# Patient Record
Sex: MALE | Race: WHITE | Employment: OTHER | URBAN - METROPOLITAN AREA
[De-identification: names, ages, dates, MRNs, and addresses within clinical notes are randomized per-mention and may not be internally consistent; named-entity substitution may affect disease eponyms.]

---

## 2022-05-13 ENCOUNTER — APPOINTMENT (OUTPATIENT)
Dept: GENERAL RADIOLOGY | Age: 72
End: 2022-05-13
Attending: STUDENT IN AN ORGANIZED HEALTH CARE EDUCATION/TRAINING PROGRAM
Payer: MEDICARE

## 2022-05-13 ENCOUNTER — HOSPITAL ENCOUNTER (EMERGENCY)
Age: 72
Discharge: HOME | End: 2022-05-13
Attending: STUDENT IN AN ORGANIZED HEALTH CARE EDUCATION/TRAINING PROGRAM | Admitting: STUDENT IN AN ORGANIZED HEALTH CARE EDUCATION/TRAINING PROGRAM
Payer: MEDICARE

## 2022-05-13 VITALS
WEIGHT: 210 LBS | RESPIRATION RATE: 20 BRPM | DIASTOLIC BLOOD PRESSURE: 110 MMHG | HEIGHT: 65 IN | SYSTOLIC BLOOD PRESSURE: 200 MMHG | BODY MASS INDEX: 34.99 KG/M2 | OXYGEN SATURATION: 97 % | TEMPERATURE: 100 F | HEART RATE: 80 BPM

## 2022-05-13 DIAGNOSIS — J20.8 VIRAL BRONCHITIS: Primary | ICD-10-CM

## 2022-05-13 DIAGNOSIS — R07.89 CHEST WALL PAIN: ICD-10-CM

## 2022-05-13 DIAGNOSIS — I10 ASYMPTOMATIC HYPERTENSION: ICD-10-CM

## 2022-05-13 LAB
COVID-19 RAPID TEST, COVR: NOT DETECTED
FLUAV AG NPH QL IA: NEGATIVE
FLUBV AG NOSE QL IA: NEGATIVE
SOURCE, COVRS: NORMAL

## 2022-05-13 PROCEDURE — 96372 THER/PROPH/DIAG INJ SC/IM: CPT

## 2022-05-13 PROCEDURE — 99284 EMERGENCY DEPT VISIT MOD MDM: CPT

## 2022-05-13 PROCEDURE — 87804 INFLUENZA ASSAY W/OPTIC: CPT

## 2022-05-13 PROCEDURE — 87635 SARS-COV-2 COVID-19 AMP PRB: CPT

## 2022-05-13 PROCEDURE — 71045 X-RAY EXAM CHEST 1 VIEW: CPT

## 2022-05-13 PROCEDURE — 74011250636 HC RX REV CODE- 250/636: Performed by: STUDENT IN AN ORGANIZED HEALTH CARE EDUCATION/TRAINING PROGRAM

## 2022-05-13 RX ORDER — LIDOCAINE 4 G/100G
1 PATCH TOPICAL EVERY 12 HOURS
Qty: 5 PATCH | Refills: 2 | Status: SHIPPED | OUTPATIENT
Start: 2022-05-13

## 2022-05-13 RX ORDER — KETOROLAC TROMETHAMINE 30 MG/ML
30 INJECTION, SOLUTION INTRAMUSCULAR; INTRAVENOUS
Status: COMPLETED | OUTPATIENT
Start: 2022-05-13 | End: 2022-05-13

## 2022-05-13 RX ORDER — ACETAMINOPHEN 500 MG
1000 TABLET ORAL
Status: DISCONTINUED | OUTPATIENT
Start: 2022-05-13 | End: 2022-05-13

## 2022-05-13 RX ORDER — NAPROXEN 500 MG/1
500 TABLET ORAL
Qty: 20 TABLET | Refills: 0 | Status: SHIPPED | OUTPATIENT
Start: 2022-05-13

## 2022-05-13 RX ORDER — ALBUTEROL SULFATE 90 UG/1
2 AEROSOL, METERED RESPIRATORY (INHALATION)
Qty: 18 G | Refills: 0 | Status: SHIPPED | OUTPATIENT
Start: 2022-05-13

## 2022-05-13 RX ADMIN — KETOROLAC TROMETHAMINE 30 MG: 30 INJECTION, SOLUTION INTRAMUSCULAR at 22:47

## 2022-05-14 NOTE — ED PROVIDER NOTES
EMERGENCY DEPARTMENT HISTORY AND PHYSICAL EXAM      Date: 5/13/2022  Patient Name: Yeimy Nielsen    History of Presenting Illness     Chief Complaint   Patient presents with    Cough     Patient to triage w c/o a chest cold onset 2 weeks ago. Reports he is unvaccinated.  Chest Congestion       History Provided By: Patient    HPI: Yeimy Nielsen, 74 y.o. male with past medical history of no known medical problems presents to the ED with cc of cough, congestion, shortness of breath, and right-sided rib pain. Patient reports he has had 2 weeks of the above symptoms. Reports he has developed right-sided anterior chest wall pain from repeated coughing episodes. States pain is sharp in nature, comes on only when he coughs or twists his torso. Absent when he is not moving or coughing. States he is able to reproduce the pain in a discrete location in the anterior lateral chest wall. Earlier today, patient reports he had a 5-second episode of severe shortness of breath that then passed. He denies any fevers or chills. No known sick contacts. Patient has not been tested for COVID recently. His blood pressure is noted to be extremely elevated in the ED, however, he states that he has not taken any of his medications today. Reports his blood pressure normally is well controlled when he does take his BP meds. PCP: No primary care provider on file. No current facility-administered medications on file prior to encounter. No current outpatient medications on file prior to encounter. Past History     Past Medical History:  No past medical history on file. Past Surgical History:  No past surgical history on file. Family History:  No family history on file. Social History:  Social History     Tobacco Use    Smoking status: Not on file    Smokeless tobacco: Not on file   Substance Use Topics    Alcohol use: Not on file    Drug use: Not on file       Allergies:   Allergies   Allergen Reactions  Lisinopril Hives         Review of Systems   Review of Systems   Constitutional: Negative for chills and fever. HENT: Positive for congestion and rhinorrhea. Eyes: Negative for visual disturbance. Respiratory: Positive for cough and shortness of breath. Negative for chest tightness and wheezing. Cardiovascular: Negative for chest pain, palpitations and leg swelling. Gastrointestinal: Negative for abdominal pain, diarrhea, nausea and vomiting. Genitourinary: Negative for dysuria, flank pain and hematuria. Musculoskeletal: Positive for myalgias. Negative for back pain and neck pain. Skin: Negative for rash. Allergic/Immunologic: Negative for immunocompromised state. Neurological: Negative for dizziness, speech difficulty, weakness and headaches. Hematological: Negative for adenopathy. Psychiatric/Behavioral: Negative for dysphoric mood and suicidal ideas. Physical Exam   Physical Exam  Vitals and nursing note reviewed. Constitutional:       General: He is not in acute distress. Appearance: Normal appearance. He is not ill-appearing or toxic-appearing. HENT:      Head: Normocephalic and atraumatic. Nose: Nose normal.      Mouth/Throat:      Mouth: Mucous membranes are moist.   Eyes:      Extraocular Movements: Extraocular movements intact. Pupils: Pupils are equal, round, and reactive to light. Cardiovascular:      Rate and Rhythm: Normal rate and regular rhythm. Pulses: Normal pulses. Pulmonary:      Effort: Pulmonary effort is normal. No respiratory distress. Breath sounds: No stridor. Examination of the right-lower field reveals wheezing. Examination of the left-lower field reveals wheezing. Wheezing present. No rhonchi. Chest:      Chest wall: Tenderness present. Abdominal:      General: Abdomen is flat. There is no distension. Palpations: There is no mass. Tenderness: There is no abdominal tenderness.    Musculoskeletal: General: Normal range of motion. Cervical back: Normal range of motion and neck supple. Skin:     General: Skin is warm and dry. Neurological:      General: No focal deficit present. Mental Status: He is alert. Mental status is at baseline. Sensory: No sensory deficit. Motor: No weakness. Diagnostic Study Results     Labs -     Recent Results (from the past 24 hour(s))   COVID-19 RAPID TEST    Collection Time: 05/13/22 10:05 PM   Result Value Ref Range    Specimen source Nasopharyngeal      COVID-19 rapid test Not detected NOTD     INFLUENZA A+B VIRAL AGS    Collection Time: 05/13/22 10:05 PM   Result Value Ref Range    Influenza A Antigen Negative NEG      Influenza B Antigen Negative NEG         Radiologic Studies -   XR CHEST PORT   Final Result   Cardiomegaly. No infiltrate        CT Results  (Last 48 hours)    None        CXR Results  (Last 48 hours)               05/13/22 2200  XR CHEST PORT Final result    Impression:  Cardiomegaly. No infiltrate       Narrative:  EXAM: XR CHEST PORT       INDICATION: cough       COMPARISON: None. FINDINGS: A portable AP radiograph of the chest was obtained at 2155 hours. The   patient is on a cardiac monitor. The lungs are clear. The cardiac and   mediastinal contours and pulmonary vascularity are remarkable for cardiomegaly. There are multiple right posterior rib deformities. Moderate ossification is   noted of the acromioclavicular ligament. Medical Decision Making   I, Sherine Lacey MD am the first provider for this patient, and I am the attending of record for this patient encounter. I reviewed the vital signs, available nursing notes, past medical history, past surgical history, family history and social history. Vital Signs-Reviewed the patient's vital signs.   Patient Vitals for the past 24 hrs:   Temp Pulse Resp BP SpO2   05/13/22 2212 -- -- -- (!) 200/110 --   05/13/22 2147 100 °F (37.8 °C) 80 20 (!) 219/109 97 %     Records Reviewed: Nursing Notes and Old Medical Records    Provider Notes (Medical Decision Making):   DDx: Bronchitis, viral URI, musculoskeletal chest wall pain, pneumothorax, pneumonia, COVID infection, influenza, etc.    Plan: Rapid COVID, influenza swab, chest x-ray. IM Toradol for chest wall pain. COVID and influenza studies are negative. Chest x-ray negative for infiltrate. Patient symptoms most likely secondary to viral bronchitis along musculoskeletal chest wall pain. Will provide Rx for albuterol inhaler, lidocaine patch, and naproxen for home treatment of symptoms. Advised to take his home antihypertensives for extremely elevated BP. No indication to treat BP emergently secondary to patient not having any symptoms concerning for endorgan damage at this time. ED Course:   Initial assessment performed. The patient's presenting problems have been discussed, and they are in agreement with the care plan formulated and outlined with them. I have encouraged them to ask questions as they arise throughout their visit. Luz Elena Chandler MD      Disposition:  Discharge      DISCHARGE PLAN:  1. Current Discharge Medication List      START taking these medications    Details   naproxen (NAPROSYN) 500 mg tablet Take 1 Tablet by mouth every twelve (12) hours as needed for Pain. Qty: 20 Tablet, Refills: 0  Start date: 5/13/2022      lidocaine 4 % patch 1 Patch by TransDERmal route every twelve (12) hours every twelve (12) hours. Qty: 5 Patch, Refills: 2  Start date: 5/13/2022      albuterol (PROVENTIL HFA, VENTOLIN HFA, PROAIR HFA) 90 mcg/actuation inhaler Take 2 Puffs by inhalation every four (4) hours as needed for Wheezing, Shortness of Breath, Respiratory Distress or Cough. Qty: 18 g, Refills: 0  Start date: 5/13/2022           2.    Follow-up Information     Follow up With Specialties Details Why Contact Info    Alberto Carballo MD Family Medicine In 2 days  [address] [address] [facility]  [phone]          3. Return to ED if worse     Diagnosis     Clinical Impression:   1. Viral bronchitis    2. Chest wall pain    3. Asymptomatic hypertension        Attestations:    Desiree Sherwood MD    Please note that this dictation was completed with [employer], the computer voice recognition software. Quite often unanticipated grammatical, syntax, homophones, and other interpretive errors are inadvertently transcribed by the computer software. Please disregard these errors. Please excuse any errors that have escaped final proofreading. Thank you.